# Patient Record
(demographics unavailable — no encounter records)

---

## 2024-10-18 NOTE — IMAGING
[Right] : right knee [AP] : anteroposterior [Lateral] : lateral [[place]] : skyline [Components well fixed, in good position] : Components well fixed, in good position

## 2024-10-18 NOTE — ASSESSMENT
[FreeTextEntry1] : S/P RT TKA 9/9/24: DOING MUCH BETTER, DVT BUT WAS NEG  WITH VASC.  WILL CONT WITH PT, PT REASSURED WILL FOLLOW UP IN 3 MONTHS

## 2024-10-18 NOTE — PHYSICAL EXAM
[Right] : right knee [] : no sign of infection [FreeTextEntry3] : RLE SWELLING [TWNoteComboBox7] : flexion 100 degrees [de-identified] : extension 0 degrees

## 2024-10-18 NOTE — HISTORY OF PRESENT ILLNESS
[8] : 8 [0] : 0 [Sharp] : sharp [Occasional] : occasional [Rest] : rest [Standing] : standing [Walking] : walking [de-identified] : 7.26.24 NEW PATIENT HERE FOR RIGHT KNEE PAIN. PAIN STARTED YEARS AGO, NO INJURY. PATIENT STATES THE GEL INJECTIONS DO NOT WORK.  9.20.24: HERE FOR 1ST PO RIGHT KNEE. DOS: 09/09/24 . IN A LOT OF PAIN, LIFTING UP FROM CHAIR IS THE WORST. SORE AFTER PT AND STIFF. DENIES FEVERS AND CHILLS.  10.18.24: 2ND POST OP OF RIGHT KNEE. DOS: 09/09/24. FROM LAST VISIT, WE SENT HER TO [facility] TO RULE OUT A DVT, THEY STATED SHE HAD A RIGHT POPLITEAL VEIN THROMBUS. WENT TO [facility] FOR MANAGEMENT OF ANTICOAGUALTION. INCREASED 5MG 2X A DAY FROM DVT PROPHYLAXIS DOSE OF 2.5MG. SWELLING IS IMPROVING.

## 2024-10-18 NOTE — DISCUSSION/SUMMARY
[de-identified] : I, Etta Matos, am scribing for Dr. Murphy in his presence for the chief complaint, physical exam, studies, assessment, and/or plan.

## 2025-01-17 NOTE — DISCUSSION/SUMMARY
[de-identified] : I, Etta Matos, am scribing for Dr. Murphy in his presence for the chief complaint, physical exam, studies, assessment, and/or plan.

## 2025-01-17 NOTE — HISTORY OF PRESENT ILLNESS
[8] : 8 [0] : 0 [Sharp] : sharp [Occasional] : occasional [Rest] : rest [Standing] : standing [Walking] : walking [de-identified] : 7.26.24 NEW PATIENT HERE FOR RIGHT KNEE PAIN. PAIN STARTED YEARS AGO, NO INJURY. PATIENT STATES THE GEL INJECTIONS DO NOT WORK.  9.20.24: HERE FOR 1ST PO RIGHT KNEE. DOS: 09/09/24 . IN A LOT OF PAIN, LIFTING UP FROM CHAIR IS THE WORST. SORE AFTER PT AND STIFF. DENIES FEVERS AND CHILLS.  10.18.24: 2ND POST OP OF RIGHT KNEE. DOS: 09/09/24. FROM LAST VISIT, WE SENT HER TO [facility] TO RULE OUT A DVT, THEY STATED SHE HAD A RIGHT POPLITEAL VEIN THROMBUS. WENT TO [facility] FOR MANAGEMENT OF ANTICOAGUALTION. INCREASED 5MG 2X A DAY FROM DVT PROPHYLAXIS DOSE OF 2.5MG. SWELLING IS IMPROVING.   1/17/2025 PATIENT FEELS SIGNIFICANT IMPROVEMENT TODAY. SHE HAS FINISHED PT

## 2025-01-17 NOTE — ASSESSMENT
[FreeTextEntry1] : S/P RT TKA 9/9/24: DOING WELL. FINISHED PT. XRAYS STABLE. QUESTIONS ANSWERED. PT REASSURED. WILL CONT WITH HEP AND FU IN SEPT., ABX PPX DISCUSSED WITH [facility] PRN DENTAL WORK  WILL CONT WITH PT TO HELP WITH FLEXION WITH AIM OF GETTING   PT AWARE

## 2025-01-17 NOTE — PHYSICAL EXAM
[Right] : right knee [4___] : quadriceps 4[unfilled]/5 [] : no sign of infection [FreeTextEntry3] : RLE SWELLING [TWNoteComboBox7] : flexion 105 degrees [de-identified] : extension 0 degrees

## 2025-01-17 NOTE — IMAGING
[Right] : right knee [AP] : anteroposterior [Lateral] : lateral [[place]] : skyline [Components well fixed, in good position] : Components well fixed, in good position